# Patient Record
Sex: FEMALE | Race: WHITE
[De-identification: names, ages, dates, MRNs, and addresses within clinical notes are randomized per-mention and may not be internally consistent; named-entity substitution may affect disease eponyms.]

---

## 2020-12-29 NOTE — RAD
XR Chest 1 View Portable



History: Preop evaluation



Comparison: Radiograph 2006



Findings: Heart size is enlarged. Mild pulmonary venous congestion. Scattered scarring throughout the
 lungs. No pneumothorax. No acute osseous abnormality.



Impression: Cardiomegaly with apical scarring. No evidence for acute pneumonia.



Reported By: Alex Bravo 

Electronically Signed:  12/29/2020 2:05 PM

## 2020-12-29 NOTE — OP
DATE OF PROCEDURE:  12/29/2020



PREOPERATIVE DIAGNOSIS:  Acute appendicitis and perforated viscus.



POSTOPERATIVE DIAGNOSIS:  Acute appendicitis with rupture and pelvic abscess.



PROCEDURE PERFORMED:  Laparoscopic appendectomy and drainage of pelvic abscess.



ANESTHESIA:  General endotracheal.



ESTIMATED BLOOD LOSS:  5 mL.



FLUIDS GIVEN:  1100 mL crystalloids.



COUNTS:  Sponge and instrument counts were verified as correct x2.



COMPLICATIONS:  None apparent at the time of operation.



INDICATIONS FOR OPERATION:  This is an 86-year-old woman who presented with two

weeks history of "kidney infection," for which she was on antibiotics. 



Over the last three days, she has experienced worsening lower abdominal pain. 



Clinical and radiographic examination were consistent with acute appendicitis 
and

perforated viscus, marked by pneumoperitoneum and free fluid. 



The patient was brought to the operating room for presumptive diagnosis of acute

appendicitis with perforation. 



Findings are consistent with suppurative acute appendicitis with rupture at the 
base

with extraluminal appendicolith. 



Also noted is extensive amount of peritoneal inflammation localized to mostly 
the

right lower quadrant, and there is associated periappendiceal and pelvic 
abscesses. 



DESCRIPTION OF OPERATION:  Informed consent obtained from the patient, who was

brought to the operating room and placed in supine position. 



Following general anesthesia, a Rader catheter was inserted and placed to 
bedside

drain. 



The abdomen was then sterilely prepped and draped in usual fashion. 



The patient had previously undergone multiple abdominal operations, last of 
which

the umbilicus was excised, and given this history, we decided to enter the

peritoneal cavity through the left upper quadrant. 



She also had had a previous cholecystectomy. 



To achieve this, we anesthetized the left subcostal region with 0.25% Marcaine. 



A 5 mm transverse incision was made here using 11 scalpel. 



Peritoneal cavity was then entered using a Veress needle. 



The abdomen was then insufflated with 2.5 L of CO2 gas noting intraabdominal

pressure of 1 mmHg. 



Following abdominal insufflation, the Veress needle was removed and a 5 mm 
trocar

introduced using a Visiport under laparoscopy. 



Laparoscopy confirmed proper placement of the port.  No injuries to underlying

structures. 



Additional laparoscopy reveals the right lower quadrant completely encased by

omental adhesions.  An extensive amount of inflammation marked by purulent 
fibrinous

exudates and purulent fluid is noted in the deep pelvis.  Under direct 
laparoscopy,

two 5 mm suprapubic and left lower quadrant ports were placed after the 
overlying

skin were infiltrated with 0.25% Marcaine with epinephrine.  Appropriate 
incision

was made. 



The patient was then placed in a Trendelenburg position, rotated to the left. 



I used an Endo suction catheter to evacuate excess purulent fluid. 



The omental adhesions were then bluntly dissected off the right lower quadrant 
to

expose markedly dilated retrocecal appendix. 



I introduced the Endo [place] forceps through the suprapubic port site grasping 
the

appendix, which was elevated. 



At that juncture, we noted an extraluminal piece of an appendicolith at the base
of

the appendix and also 5 mm hole at the base of the appendix. 



The appendicolith was delivered off the abdominal cavity. 



Mesoappendix was then sterilely divided down to the base using the LigaSure 
device

with good hemostasis. 



The appendix itself was then divided at the appendicocecal junction between

Endoloop. 



The suppurative appendix was delivered off the abdominal cavity using the 
EndoCatch. 



Operative site was irrigated with saline. 



Excess irrigant fluid was evacuated using suction. 



#19 Estuardo drain was introduced into the pelvis with the tip  in the right

lateral gutter adjacent to the operative site. 



This was allowed to exit the abdominal cavity through the suprapubic port site. 



The drain was secured to anterior abdominal wall using 2-0 silk suture. 



Finding no other pathology, laparoscopy was terminated. 



The abdomen was desufflated. 



All ports and instruments removed and accounted for. 



Skin incisions closed using 4-0 Monocryl suture in subcuticular fashion. 



Dermabond was applied over incisional closure. 



The patient tolerated the operation without any apparent complication and was

returned to the recovery room in a satisfactory condition. 







Job ID:  [id number]



[facility]

## 2020-12-29 NOTE — HP
HISTORY OF PRESENT ILLNESS:  Ms. Muñoz is an 86-year-old pleasant  woman

who presented to a hospital in [place] this morning complaining of insidious onset

right lower quadrant abdominal pain, which started yesterday. 



The pain is described as sharp, occasionally crampy and associated with some nausea

and anorexia. 



She reports recent treatment of "kidney infection," which required antibiotic

treatment for 10 days, which was extended for another 10 days for a total of 20 days

of oral antibiotics. 



During that period, she was also placed on probiotics. 



The patient reports chronic fatigue over the same duration, although slightly

improved overall. 



She denies any change in her bowel habits.  Last bowel movement was yesterday and

was apparently normal. 



She denies any fevers or chills. 



She denies any hematochezia, melena, or unexplained weight loss. 



She denies having had any colonoscopies in the past.



PAST MEDICAL HISTORY:  Pertinent for peripheral vascular disease, coronary artery

disease, and essential hypertension. 



PAST SURGICAL HISTORY:  Pertinent for  x2 through a low midline incision.

Other surgeries include cervical and upper thoracic spinal fusion as well as triple

vessel coronary arterial bypass graft in .  She has also had previous

cholecystectomy. 



SOCIAL HISTORY:  She is a retired . 



She used to smoke up to a pack of cigarettes per day, did so for almost 30 years,

but has not smoked over the last 20 years. 



She denies any ethanol or illicit drug abuse.



FAMILY HISTORY:  Noncontributory for this patient's age.



CURRENT MEDICATIONS:  

1. Amlodipine 10 mg p.o. daily.

2. Aspirin 81 mg p.o. daily.

3. Benazepril 10 mg p.o. daily.

4. Florastor 250 mg p.o. b.i.d.

5. Hydrochlorothiazide 25 mg p.o. daily.

6. Metoprolol extended release 50 mg p.o. daily.

7. Vitamin D3 1000 units p.o. daily.

8. Tramadol 50 mg one to two p.o. b.i.d. p.r.n. for pain.



ALLERGIES:  MEPERIDINE AND OPIOIDS, WHICH IS MORE LIKE GI UPSET AS OPPOSED TO TRUE

ALLERGIES. 



REVIEW OF SYSTEMS:  10-point review of systems essentially unremarkable except as

stated in past medical history and chief complaint. 



PHYSICAL EXAMINATION:

GENERAL:  This reveals an 86-year-old normally developed woman, who is otherwise

coherent, interactive, and appears stated age.  The patient is alert and oriented

x3.  She appears to be in no acute distress at the time of my evaluation. 

VITAL SIGNS:  Blood pressure 123/43, pulse 77, respiratory rate is 18, temperature

is 98.2 degrees Fahrenheit, oxygen saturation is 98% on room air. 

HEENT:  Normocephalic and atraumatic.  Pupils equally round and reactive to light

and accommodation.  Extraocular muscles are intact bilaterally.  No scleral icterus

present.  Oral mucosa is pink and moist.  No lesions are noted. 

NECK:  She has no jugular venous distention noted. 

HEART:  Regular rate and rhythm.  No murmurs or gallops auscultated. 

LUNGS:  Clear to auscultation bilaterally.  Her breathing is regular and nonlabored. 

ABDOMEN:  Soft with right lower quadrant tenderness to palpation.  She has a

positive Rovsing sign.  She has a healed irregularly shaped infraumbilical midline

incision consistent with previous history of  x2.  She also has a healed

median sternotomy incision consistent with previous history of three-vessel coronary

arterial bypass grafts.  Liver and spleen otherwise nonpalpable below costal margin. 

NEUROLOGIC:  No focal deficits present.



LABORATORY FINDINGS:  Today include a CBC with 10,500 white blood cells, hemoglobin

and hematocrit 12.5 and 38.6 respectively, platelet count is 235,000. 



Metabolic profile:  Sodium 139, potassium 4.4, chloride is 107, bicarb is 23, BUN

23, creatinine is 1.58, glucose 112, AST and ALT normal at 12 and 8 units/L

respectively. 



IMAGING DATA:  I have personally reviewed the CT scan of the abdomen and pelvis,

which is remarkable for dilated appendix with periappendiceal fat stranding as well

as inflammation around the cecum.  There is scattered tiny pneumoperitoneum as well

as free fluid within the pelvis. 



IMPRESSION:  Acute appendicitis with perforation.  Given this 86-year-old woman has

not had any previous lower endoscopies, neoplastic process in this age group cannot

be excluded as the etiology of the perforated viscus with possible secondary acute

appendicitis. 



RECOMMENDATIONS AND PLAN:  Laparoscopic appendectomy and possible exploratory

laparotomy if indicated. 



Above findings and plan has been discussed with the patient and her adult daughter

at bedside.  All in the presence of the patient's nurse. 



I have advised the patient of the risks and benefits of the proposed surgery to

include, but not limited to bleeding, infection, injury to bowel or surrounding

structures. 



The patient is also fully aware that if neoplastic process was suspected by virtue

of all operative findings, exploratory laparotomy and possible bowel resection will

be contemplated at that time. 



I have answered her questions.  The patient has granted consent for this admission

and surgical intervention. 





Job ID:  [id number]

## 2020-12-29 NOTE — CT
CT ABDOMEN AND PELVIS WITHOUT CONTRAST:

12/29/20

 

A noncontrast CT was ordered without oral or IV contrast. Comparison is made with a 10/16/13 study. 

 

The most pertinent findings are in the CT of the pelvis. There is inflammatory reaction around the ba
se of the cecum in the right lower quadrant. What I interpret to be the appendix is enlarged and meena
ures about 1.5 cm in diameter and has slightly enhancing walls. Additionally, there is free fluid in 
the cul-de-sac, more on the right than the left, and a few tiny locules of free air are seen just rick
eath the anterior abdominal wall in the midline and probably a couple in the right lower quadrant as 
well. The findings suggest the possibility of acute appendicitis with perforation. The bowel in the r
egion also could have pathology in it and the appendiceal enlargement is secondary. Pure acute append
icitis is unusual in this age group, so the possibility of other pathology should be considered. Whil
e there is abundant gas in the right and transverse colon, this is felt to be reactive in nature. The
re are no findings strongly suggestive of overt obstruction. The urinary bladder has somewhat thick w
alls uniformly, but it is also not very full. 

 

The lung bases are clear except for some minimal dependent atelectasis. The liver and spleen were unr
emarkable for a non-contrast study. There is a question of an 8 to 9 mm lucency at the junction of th
e neck and body of the pancreas of uncertain significance. I do not see it on the 2013 scan. The kidn
eys show no mass, hydronephrosis, or signs of renal calculi. A 1.7 cm lucency in the right adrenal gl
and is no different than 2013 and is most likely a small adenoma. There is a new rounded area in the 
left adrenal gland measuring about 1.7 cm in size with CT numbers in the 9 to 10 range. While not pre
sent before, an adenoma seems likely. 

 

IMPRESSION: 

1.      Inflammatory changes in the right lower quadrant around the cecal base with a few tiny locule
s of free air anteriorly and free fluid in the pelvis. This, along with some dilation of the appendix
 (1.5 cm) and thickening of its walls, raises the question of acute appendicitis. There is a chance t
hat there could have been a pathologic event nearby and it is secondarily inflamed. As appendicitis i
s somewhat unusual in the age group, associated pathology is certainly possible. 

 

2.      Probable left adrenal adenoma of no current concern. Right adrenal adenoma unchanged since 20
13. 

 

3.      Small 8 to 9 mm lucency at the junction of the neck and body of the pancreas of uncertain sig
nificance, not present on a 2013 scan.

 

 

Initial findings discussed with Dr. Valentino at 1039 on 12/29/20.

 

POS: HOME

## 2020-12-30 NOTE — PRG
DATE OF SERVICE:  12/30/2020



SUBJECTIVE:  Ms. Muñoz is an 86-year-old woman, postoperative day #1, status post

laparoscopic appendectomy and drainage of pelvic abscess. 



The patient is awake and alert this morning, reporting incisional pain. 



She is tolerating clear liquid diet. 



Urinary output is adequate for this patient's age and weight.



OBJECTIVE:  VITAL SIGNS:  This morning include blood pressure 120/59, pulse is 72,

respiratory rate is 16, maximum temperature since yesterday is 98.5 degrees

Fahrenheit, and oxygen saturation this morning 93% on room air. 

HEENT:  Pupils are equally round, reactive to light and accommodation. 

HEART:  Regular rate and rhythm. 

LUNGS:  Clear to auscultation bilaterally.  Her breathing is regular and nonlabored. 

ABDOMEN:  Soft, moderately distended and tender to palpation.  She has no peritoneal

signs on examination.  Derek-Kaye drain has returned 80 mL of cloudy fluid. 

NEUROLOGIC:  No focal deficits present.



LABORATORY FINDINGS:  Today include a CBC with 11,100 white blood cells, hemoglobin

and hematocrit are stable at 11.1 and 34.2 respectively, and platelet count is

195,000. 



Metabolic profile:  Sodium 140, potassium 4.4, chloride is 108, bicarb is 21, BUN is

30, creatinine is 1.64, and glucose is 132.  Magnesium is 1.6 and phosphorus is 4.0. 



IMPRESSION:  

1. Postoperative day #1, status post laparoscopic appendectomy and drainage of

pelvic abscess. 

2. Acute hypomagnesemia.

3. Acute kidney injury.



PLAN:  

1. Correct abnormal electrolytes.

2. Continue with fluid resuscitation and monitoring urinary output and serum

creatinine as endpoint of our resuscitation. 

3. Continue with intravenous antibiotic therapy for the acute peritonitis secondary

to ruptured appendix with pelvic abscess. 

4. Increase activity per Physical and Occupational Therapy.

5. Advance diet as tolerated. 

Above findings and plan was discussed with the patient and her adult daughter at

bedside.  They both indicated understanding information provided.  I have answered

their questions. 







Job ID:  [id number]

## 2020-12-31 NOTE — PRG
DATE OF SERVICE:  12/30/2020



SUBJECTIVE:  The patient was seen this evening during rounds.  She was lying in bed

and reported she has about 7/10 abdominal pain.  States that she has not asked for

pain medications, but when she has received them before, they have helped.  Nursing

called to the bedside to give the patient pain medication. 



OBJECTIVE:  VITAL SIGNS:  Temperature 97.6, pulse 86, respirations 20, oxygen

saturation 94% on room air, and blood pressure 146/66. 

GENERAL:  Well-appearing elderly female, lying in bed with no signs of acute

distress. 

ABDOMEN:  Soft, mildly distended, and appropriately tender to palpation.  LALA drain

with purulent discharge and bulb. 



ASSESSMENT:  

1. Postoperative day #1, status post laparoscopic appendectomy with drainage of

pelvic abscess due to ruptured appendix with pelvic abscess. 

2. History of peripheral vascular disease, coronary artery disease, hypertension,

chronic kidney disease, and coronary artery bypass graft. 



PLAN:  Continue current diet and pain regimen.  Continue IV fluids.  Continue to

monitor pain closely as the patient does not ask for pain medications.  I did

discuss this with the nurse.  She did work with Physical Therapy today and walked

quite a bit.  Monitor for return of bowel function. 







Job ID:  [id number]

## 2020-12-31 NOTE — PRG
DATE OF SERVICE:  12/31/2020



SUBJECTIVE:  Ms. Muñoz is an 86-year-old woman who is postoperative day #2, status

post laparoscopic appendectomy and drainage of pelvic abscess.  The patient is awake

and alert.  She reports right lower quadrant abdominal pain, which she rates at 7/10

to 8/10. 



She has not taken enough of oral pain medications nevertheless.   



She is tolerating clear liquid diet.  She denies passing any flatus or bowel

movement. 



Urinary output remains adequate for the patient's age and weight.



OBJECTIVE:  VITAL SIGNS:  Today include, blood pressure 125/71, pulse is 84,

respiratory rate is 18, maximum temperature in last 24 hours is 98.4 degrees

Fahrenheit, oxygen saturation is 92% on room air. 

HEENT:  Pupils equal, round, reactive to light and accommodation.  She has no

jugular venous distention noted. 

HEART:  Reveals regular rate and rhythm. 

LUNGS:  Reveals bibasilar rhonchi.  Breathing regular and nonlabored. 

ABDOMEN:  Soft, moderately distended with gas.  Incisions are intact, clean and dry.

 Derek-Kaye drain return 70 mL of cloudy ascitic fluid overnight. 

NEUROLOGIC:  Reveals no focal deficits present.



LABORATORY FINDINGS:  Today include, a CBC with 9,900 white blood cells, hemoglobin

and hematocrit are 9.8 and 32.8 respectively.  The platelet count is 191,000. 



Metabolic profile; sodium 141, potassium 4.2, chloride is 109, bicarb is 22, BUN 31,

creatinine is 1.57, glucose is 88, magnesium 1.8, and phosphorus 2.9. 



IMPRESSIONS:  

1. Postop day #2 status post laparoscopic appendectomy and drainage of pelvic

abscess. 

2. Resolving acute kidney injury.

3. Acute hypomagnesemia.

4. Acute hypophosphatemia.

5. Acute blood loss anemia.



PLAN:  

1. Correct abnormal electrolytes.

2. Optimize pain control.

3. Increase activity per Physical and Occupational Therapy.  We will ask PM and R to

evaluate the patient for possible inpatient rehabilitation post discharge. 

Above findings and plan discussed with the patient and her adult daughter at

bedside. 



Both indicated understanding of information provided.  I have answered their

questions. 







Job ID:  [id number]

## 2021-01-01 NOTE — PRG
DATE OF SERVICE:  12/31/2020



SUBJECTIVE:  The patient was seen this evening during rounds.  She was lying in bed,

resting comfortably, and asleep with no signs of acute distress.  Nursing reported

no acute events. 



OBJECTIVE:  VITAL SIGNS:  Temperature 97.6, pulse 88, respirations 18, oxygen

saturation 92% on 2 L nasal cannula, blood pressure 139/71. 



ASSESSMENT:  

1. Postop day #2 status post laparoscopic appendectomy with drainage of pelvic

abscess secondary to ruptured appendix with pelvic abscess. 

2. History of peripheral vascular disease, coronary artery disease, hypertension,

chronic kidney disease, and CABG. 



PLAN:  Continue current clear liquid diet.  IV fluids have been stopped.  Monitor

urinary output.  Repeat blood work in the morning.  Ambulation and sitting up in bed

are paramount as we are pending return of bowel function.  Also, we will become more

aggressive with the incentive spirometry as the patient is now requiring oxygen and

likely has related atelectasis. 







Job ID:  [id number]

## 2021-01-01 NOTE — PRG
DATE OF SERVICE:  01/01/2021



SUBJECTIVE:  The patient was seen during morning rounds on the surgical floor.  The

patient is currently awake, alert, sitting up in bed, in no distress.  The patient

had no overnight events.  The patient states her pain is much improved.  The patient

does report passing flatus 4 times this morning.  No bowel movement.  The patient is

tolerating a clear liquid diet. 



Urinary output is adequate for age and weight.



OBJECTIVE:  VITAL SIGNS:  Temperature 97.4, pulse 92, respirations 16, SpO2 95% on

room air, blood pressure 166/69. 

GENERAL:  Well-appearing elderly female, in no distress. 

HEENT:  Unremarkable, no JVD. 

RESPIRATORY:  Respirations are even and nonlabored, bilateral breath sounds clear in

upper lobes, diminished breath sounds in bilateral lower lobes. 

CARDIAC:  Regular rate regular rhythm.  No pedal edema. 

ABDOMEN:  Soft, moderately distended with gas.  No peritoneal signs.  Incisions are

intact, clean and dry.  LALA drain returned 170 mL overnight cloudy yellow output. 

EXTREMITIES:  Moves all extremities, neurovascularly intact. 

NEUROLOGIC:  No focal deficits.



LABORATORY DATA:  WBC 9.7, RBC 3.45, hemoglobin 11.0, hematocrit 35.1, platelets

216, sodium 141, potassium 4.0, chloride 108, carbon dioxide 23, BUN 28, creatinine

1.51, estimated GFR 33, glucose 74, calcium 8.9, phosphorus 2.5, magnesium 1.7. 



DIAGNOSTICS:  No new diagnostics to review today.



IMPRESSION:  

1. Postop day #3, status post laparoscopic appendectomy and drainage of pelvic

abscess. 

2. Resolving acute kidney injury.

3. Acute blood loss anemia, stable.

4. History of peripheral vascular disease.

5. Coronary artery disease.

6. Hypertension.

7. Chronic kidney disease.

8. CABG.



PLAN:  We will increase the patient's diet to heart healthy as tolerated.  Continue

physical and occupational therapy.  Aggressive pulmonary toilet.  We will

discontinue IV antibiotics and switch to oral, as the patient will be on a regular

diet.  The patient has been ambulating well with physical therapy who recommended

home health.  Case Management has been contacted to arrange for this.  We will

continue to monitor LALA drain output.  The plan was discussed with Dr. Morley. 







Job ID:  [id number]

## 2021-01-02 NOTE — PRG
DATE OF SERVICE:  01/02/2021



SUBJECTIVE:  The patient was seen during morning rounds.  Awake, alert, sitting up

in bed, in no distress.  The patient had some nausea overnight, but it has improved.

 The patient is having bowel movements.  The patient's pain is controlled. 



Urinary output has been adequate for age and weight. 



Abdominal drain output in the last 24 hours has been 125 mL of purulent yellow

drainage. 



OBJECTIVE:  VITAL SIGNS:  Temperature 98.3, pulse 87, respirations 18, SpO2 of 94%

on room air, blood pressure 156/82. 

GENERAL:  Well-appearing, elderly female, awake, alert, in no distress. 

HEENT:  Unremarkable, no JVD. 

RESPIRATORY:  Respirations are even and nonlabored, no respiratory distress. 

CARDIAC:  Regular rate and regular rhythm.  No edema. 

ABDOMEN:  Soft, less distended, no peritoneal signs.  Incisions are clean, dry, and

intact. 

EXTREMITIES:  Moves all extremities, neurovascularly intact.



LABORATORY DATA:  WBC 11.4, RBC 3.83, hemoglobin 11.9, hematocrit 38.6, platelets

274.  Sodium 139, potassium 3.7, chloride 107, BUN 27, creatinine 1.26, estimated

GFR 40, phosphorus 2.4, magnesium 1.8. 



DIAGNOSTICS:  No new diagnostics to review today.



IMPRESSION:  

1. Postop day #4, status post laparoscopic appendectomy and drainage of pelvic

abscess. 

2. Resolving acute kidney injury.

3. Acute blood loss anemia, stable.

4. History of peripheral vascular disease, coronary artery disease, hypertension,

chronic kidney disease, coronary artery bypass graft. 



PLAN:  Continue heart healthy diet as tolerated.  We will restart IV antibiotics as

the patient still has purulent drainage from her LALA drain.  Continue PT and OT.  The

plan was discussed with Dr. Morley, who agrees. 







Job ID:  [id number]

## 2021-01-02 NOTE — PRG
DATE OF SERVICE:  01/01/2021



SUBJECTIVE:  Patient was seen this evening during rounds.  She was sitting up on a

bedside commode.  The patient did have a bowel movement.  This is her first bowel

movement.  Nursing reported no acute events. 



OBJECTIVE:  VITAL SIGNS:  Temperature 97.6, pulse 81, respirations 18, oxygen

saturation 92% on room air, blood pressure 151/72. 

GENERAL:  Well-appearing elderly female, sitting up on commode with no signs of

acute distress. 



ASSESSMENT:  

1. Postoperative day 4, status post laparoscopic appendectomy and drainage of pelvic

abscess due to ruptured appendix. 

2. History of peripheral vascular disease, coronary artery disease, hypertension,

chronic kidney disease, and coronary artery bypass grafting. 



PLAN:  Continue current diet and pain regimen.  Continue physical and occupational

therapy.  Continue aggressive pulmonary hygiene as patient likely has atelectasis.

Her O2 saturation is a little bit lower than we would like.  We would like to

discharge her home tomorrow with oral antibiotics.  She does live with her daughter.

 We will discuss with Dr. Morley in the morning the plan for her LALA drain. 







Job ID:  [id number]

## 2021-01-02 NOTE — EKG
Test Reason : 

Blood Pressure : ***/*** mmHG

Vent. Rate : 075 BPM     Atrial Rate : 075 BPM

   P-R Int : 152 ms          QRS Dur : 098 ms

    QT Int : 386 ms       P-R-T Axes : -23 -20 -58 degrees

   QTc Int : 431 ms

 

Normal sinus rhythm

T wave abnormality, consider inferior ischemia

T wave abnormality, consider anterolateral ischemia

Abnormal ECG

 

Confirmed by ANNA PRADHAN, MAXWELL (12),  YORDY FOSTER ([id number]) on 1/2/2021 3:22:00 PM

 

Referred By:             Confirmed By:MAXWELL CASTILLO MD

## 2021-01-03 NOTE — PRG
DATE OF SERVICE:  01/02/2021



SUBJECTIVE:  The patient was seen this evening during rounds.  She was lying in bed,

resting comfortably, and asleep with no signs of acute distress.  Nursing reported

no acute events. 



OBJECTIVE:  VITAL SIGNS:  Temperature 97.9, pulse 78, respirations 18, oxygen

saturation 95% on room air, and blood pressure 151/62. 



ASSESSMENT:  

1. Postoperative day 4 status post laparoscopic appendectomy with drainage of a

pelvic abscess secondary to a ruptured appendix. 

2. History of deep venous thrombosis, coronary artery disease, hypertension, chronic

kidney disease, and coronary artery bypass graft. 



PLAN:  Continue current diet and pain regimen.  Continue physical and occupational

therapy.  Continue IV antibiotics.  The patient's lisinopril increased yesterday.

Continue to monitor blood pressure. 







Job ID:  [id number]

## 2021-01-03 NOTE — PRG
DATE OF SERVICE:  01/03/2021



SUBJECTIVE:  The patient was seen during morning rounds.  Awake, alert, sitting up

in bed, in no distress.  The patient had no overnight events.  The patient denies

any nausea.  The patient's urinary output has been adequate for the patient's age

and weight. 



OBJECTIVE:  VITAL SIGNS:  Temperature 98.2, pulse 88, respirations 12, SpO2 of 96%

on room air, blood pressure 147/67. 

GENERAL:  Well-appearing elderly female, awake, alert, in no distress. 

HEENT:  Unremarkable, no JVD. 

RESPIRATORY:  Respirations are even and nonlabored, bilateral breath sounds clear. 

CARDIAC:  Regular rate, regular rhythm.  No pedal edema. 

ABDOMEN:  Soft, nondistended, no peritoneal signs, nontender, incisions are clean,

dry, and intact.  LALA drain, some purulent contents with yellowish fluid, output in

the last 24 hours 75 mL. 

EXTREMITIES:  Moves all extremities, neurovascularly intact x4. 

NEUROLOGIC:  No focal deficits.  GCS 15.



LABORATORY DATA:  WBC 8.8, RBC 3.72, hemoglobin 11.2, hematocrit 37.2, and platelets

286.  Sodium 141, potassium 3.4, chloride 108, BUN 23, creatinine 1.25, estimated

GFR 41, glucose 112, calcium 8.9, phosphorus 2.5, and magnesium 1.6. 



DIAGNOSTICS:  No new diagnostics to review today.



IMPRESSION:  

1. Postoperative day #5, status post laparoscopic appendectomy and drainage of the

pelvic abscess. 

2. Resolving acute kidney injury.

3. Acute blood loss anemia, stable.

4. History of peripheral vascular disease, coronary artery disease, hypertension,

chronic kidney disease, and coronary artery bypass graft. 



PLAN:  Continue supportive care and diet as tolerated.  Continue IV antibiotics.

Continue to monitor LALA drain output.  Continue PT and OT.  Replace electrolytes.

Repeat labs in the morning.  The plan was discussed with the patient and attending,

who agree. 







Job ID:  [id number]

## 2021-01-04 NOTE — PRG
DATE OF SERVICE:  01/03/2021



SUBJECTIVE:  Patient was seen this evening during rounds.  She was lying in bed,

resting comfortably and asleep with no signs of acute distress.  Nursing reported no

acute events. 



OBJECTIVE:  VITAL SIGNS:  Temperature 97.9, pulse 80, respirations 16, oxygen

saturation 92% on room air, blood pressure 151/69. 



ASSESSMENT:  

1. Postop day 5 status post laparoscopic appendectomy with drainage of pelvic

abscess secondary to ruptured appendix. 

2. History of peripheral vascular disease, coronary artery disease, hypertension,

chronic kidney disease, and coronary artery bypass graft. 



PLAN:  Continue current diet and pain regimen.  Continue physical and occupational

therapy.  Continue IV antibiotics.  The patient to be reevaluated by Dr. Morley

tomorrow.  Possible discharge in the next 24-48 hours home with home health and oral

antibiotics. 







Job ID:  [id number]

## 2021-01-04 NOTE — DIS
DATE OF ADMISSION:  12/29/2020



DATE OF DISCHARGE:  01/04/2021



ADMITTING DIAGNOSES:  Acute appendicitis with rupture and pelvic abscess.



DISCHARGE DIAGNOSES:  Acute appendicitis with rupture and pelvic abscess.



OPERATIONS AND PROCEDURES:  Laparoscopic appendectomy and drainage of pelvic abscess

on 12/29/2020 by Dr. Morley, please see separate dictation for operative report. 



HISTORY AND HOSPITAL COURSE:  An 87-year-old woman was admitted on 12/29/2020 with

worsening right-sided lower quadrant abdominal pain. 



Clinical and radiographic examination were consistent with acute appendicitis with

pneumoperitoneum and free fluid, suggestion of perforation. 



The patient was taken to operating room for laparoscopic appendectomy and drainage

of periappendiceal and pelvic abscesses. 



Following the surgery, she was admitted to general surgical floor, where she

remained at time of discharge. 



Her care here included IV antibiotics, which were converted to oral once the patient

is tolerating diet. 



This morning, she is evaluated, she is ambulating modestly with assistance,

participating with physical and occupational therapy. 



She is tolerating general diet and having bowel movement. 



Urinary output is adequate for the patient's age and weight. 



The Derek-Kaye drain has returned less than 100 mL of serous fluid over the last

24 hours, and therefore, was removed without incident. 



LABORATORY STUDIES:  Today include a CBC with normal white blood cell count of 9900,

hemoglobin and hematocrit stable at 11.0 and 35.3 respectively.  Platelet count

330,000. 



Metabolic profile; sodium 142, potassium 3.6, chloride is 106, bicarb is 26, BUN 20,

creatinine is 1.18, compared to high 1.64 on 12/30/2020, and it was 1.58 on

admission on 12/29/2020.  The patient has a history of stage 3 kidney disease.,

being followed up by Nephrology. 



DISCHARGE INSTRUCTIONS:  She has maximized hospital benefit and will be discharged

home today with the following instructions. 

1. She follows up with her primary care physician within 1 week of discharge as we

had made some adjustments on her antihypertensive in order to better control her

blood pressure. 

2. She is to follow up with me in the Surgery [facility] in 2 weeks.

3. She is to take Tylenol 650 to 1000 mg p.o. q.6 hours p.r.n. pain, alternating

this with tramadol.  She has a recent prescription of 100 pills of tramadol.

Therefore, the patient is instructed to take tramadol 50 mg p.o. q.6 hours p.r.n.

breakthrough pain. 

4. She is instructed to resume all her pre-hospital medications including aspirin 81

mg p.o. daily, vitamin D3 of 4000 units p.o. daily, hydrochlorothiazide 25 mg p.o.

daily, amlodipine 10 mg p.o. daily, clonidine 0.1 mg p.o. at bedtime p.r.n.,

metoprolol-XL 50 mg p.o. at bedtime. 

5. The patient will be discharged home with home health care and physical therapy.

6. She is instructed to call me with any questions or problems including intolerance

to oral intake, exacerbation of abdominal pain, any abnormal drainage from the

incisional wounds, fever in excess of 101 degrees Fahrenheit. 



The patient indicates understanding of information provided to her in the presence

of her nurse. 



I have answered her questions.  The patient has expressed deep gratitude for the

care rendered to her during this hospitalization and surgery. 





Job ID:  [id number]

## 2021-01-04 NOTE — PQF
CLINICAL DOCUMENTATION CLARIFICATION FORM:







                           Dear Dr. Morley                         Date: 1/4/21



Please exercise your independent, professional judgment in responding to the 
clarification form. 

Clinical indicators are provided on the bottom of this form for your review.



Please check appropriate box(es):



[ x ] Protein Calorie Malnutrition:    [  ] Mild      [ x] Moderate   [  ] 
Severe   



[  ] Other Malnutrition (please specify) 
_________________________________________



[  ] Underweight without malnutrition



[  ] Cachexia 



[  ] Other diagnosis ___________



[  ] Unable to determine



In addition, please specify:

Present on Admission (POA):  [ x ] Yes             [  ] No             [  ] 
Unable to determine



For continuity of documentation, please document condition throughout progress 
notes and discharge summary.  Thank You.



To be completed by CDI/Coding staff for physician review: 



CLINICAL INDICATORS - SIGNS / SYMPTOMS / LABS    / RESULTS AND LOCATION IN MR



RD ASSESSMENT 12/30: "GENERALIZED MILD MUSCLE AND FAT WASTING SUGGESTIVE OF 
MODERATE MALNUTRITION IN THE CONTEXT OF CHRONIC ILLNESS."

"-6.4 % WEIGHT CHANGE"



BMI 18.9





RISK FACTORS    / RESULTS AND LOCATION IN MR



ADVANCED AGE

RUPTURED APPENDIX AND PELVIC ABSCESS



TREATMENT    / RESULTS AND LOCATION IN MR



DIETARY SUPPLEMENTS RECOMMENDED WHEN DIET ADVANCED (RD ASSESSMENT 12/30 / 
ordered 1/1)

ZOFRAN PRN NAUSEA (LAST GIVEN 1/2- SEE MAR)





Moderate Malnutrition (in acute illness)

   Energy Intake: <75% of estimated energy requirement for > 7 days

   Weight Loss:  1-2%/1 week; 5%/ 1 month; 7.5%/3 months

   Other: mild body fat loss; mild muscle mass loss; mild fluid accumulation; 

Severe Malnutrition (in acute illness)

   Energy Intake: = 50% of estimated energy requirement for = 5 days

   Weight Loss: >2%/1 week; >5%/1 month; >7.5%/3 months

   Other: moderate body fat loss; moderate muscle mass loss; moderate- severe 
fluid accumulation; measurably reduced  strength

Moderate Malnutrition (in chronic illness)

   Energy Intake: <75% of estimated energy requirement for =1 month

   Weight Loss: 5%/1 month; 7.5%/3 months; 10%/6 months; 20%/1 year

   Other: mild body fat loss; mild muscle mass loss; mild fluid accumulation

Severe Malnutrition (in chronic illness)

   Energy Intake: =75% of estimated energy requirement for =1 month

   Weight Loss: >5%/1 month; >7.5%/3 months; >10%/6 months; >20%/1 year

   Other: severe body fat loss; severe muscle mass loss; severe fluid 
accumulation; measurably reduced  strength























CDS Signature:   Anne Wallace RN                                 Phone #: 
[phone]                         Date: 1/4/21



                 This is a permanent part of the Medical Record

[facility]

## 2021-01-26 NOTE — CT
CT abdomen and pelvis noncontrast



HISTORY: Prior peritoneal abscess. Follow-up.



COMPARISON: 12/29/2020.



FINDINGS: Small amount of right pleural fluid and mild dependent bibasilar atelectasis are now presen
t. IV contrast was unable to be given due to inadequate IV access. Oral contrast was administered.



Gallbladder is surgically absent. The tiny low-density lesions of the pancreas and right adrenal glan
d are not well visualized without IV contrast. Stable CT appearance.



Subtle hyperdense material within each renal collecting system is likely related to absorption of the
 oral contrast and excretion.



Prominent calcification of the arterial structures.



Postoperative changes right lower quadrant, including a hemostasis clip lateral to the right colon. N
o significant residual stranding or residual fluid collections in the right lower quadrant.



Diverticulosis without evidence of diverticulitis. Along the superficial margin of the left lower abner
drant anterior abdominal wall, a somewhat ill-defined 1.0 cm heterogeneous area of nodularity may

be related to recent trauma or injection. Without internal fluid collection, it is of doubtful clinic
al significance.



  



IMPRESSION :

No residual inflammation or fluid in the right lower quadrant. No new abscess collection.



Reported By: KEISHA Cristobal 

Electronically Signed:  1/26/2021 9:03 AM

## 2022-04-05 ENCOUNTER — HOSPITAL ENCOUNTER (OUTPATIENT)
Dept: HOSPITAL 57 - BURRAD | Age: 87
Discharge: HOME | End: 2022-04-05
Attending: FAMILY MEDICINE
Payer: MEDICARE

## 2022-04-05 DIAGNOSIS — K59.03: Primary | ICD-10-CM

## 2022-04-05 PROCEDURE — 74018 RADEX ABDOMEN 1 VIEW: CPT

## 2022-06-23 ENCOUNTER — HOSPITAL ENCOUNTER (EMERGENCY)
Dept: HOSPITAL 57 - BURERS | Age: 87
Discharge: HOME | End: 2022-06-23
Payer: MEDICARE

## 2022-06-23 DIAGNOSIS — I71.4: Primary | ICD-10-CM

## 2022-06-23 DIAGNOSIS — Z79.899: ICD-10-CM

## 2022-06-23 DIAGNOSIS — R19.5: ICD-10-CM

## 2022-06-23 DIAGNOSIS — Z79.82: ICD-10-CM

## 2022-06-23 DIAGNOSIS — E78.5: ICD-10-CM

## 2022-06-23 DIAGNOSIS — I25.10: ICD-10-CM

## 2022-06-23 DIAGNOSIS — Z87.891: ICD-10-CM

## 2022-06-23 DIAGNOSIS — I10: ICD-10-CM

## 2022-06-23 LAB
ALBUMIN SERPL BCG-MCNC: 3.9 G/DL (ref 3.4–4.8)
ALP SERPL-CCNC: 55 U/L (ref 40–110)
ALT SERPL W P-5'-P-CCNC: 9 U/L (ref 8–55)
ANION GAP SERPL CALC-SCNC: 15 MMOL/L (ref 10–20)
AST SERPL-CCNC: 15 U/L (ref 5–34)
BASOPHILS # BLD AUTO: 0.1 THOU/UL (ref 0–0.2)
BASOPHILS NFR BLD AUTO: 1.9 % (ref 0–1)
BILIRUB SERPL-MCNC: 0.5 MG/DL (ref 0.2–1.2)
BUN SERPL-MCNC: 22 MG/DL (ref 9.8–20.1)
CALCIUM SERPL-MCNC: 9.1 MG/DL (ref 7.8–10.44)
CHLORIDE SERPL-SCNC: 105 MMOL/L (ref 98–107)
CO2 SERPL-SCNC: 23 MMOL/L (ref 23–31)
CREAT CL PREDICTED SERPL C-G-VRATE: 0 ML/MIN (ref 70–130)
EOSINOPHIL # BLD AUTO: 0.1 THOU/UL (ref 0–0.7)
EOSINOPHIL NFR BLD AUTO: 0.9 % (ref 0–10)
GLOBULIN SER CALC-MCNC: 2.6 G/DL (ref 2.4–3.5)
GLUCOSE SERPL-MCNC: 157 MG/DL (ref 83–110)
HGB BLD-MCNC: 11.8 G/DL (ref 12–16)
LIPASE SERPL-CCNC: 29 U/L (ref 8–78)
LYMPHOCYTES # BLD AUTO: 0.6 THOU/UL (ref 1.2–3.4)
LYMPHOCYTES NFR BLD AUTO: 8.1 % (ref 21–51)
MCH RBC QN AUTO: 31.6 PG (ref 27–31)
MCV RBC AUTO: 103 FL (ref 78–98)
MONOCYTES # BLD AUTO: 0.7 THOU/UL (ref 0.11–0.59)
MONOCYTES NFR BLD AUTO: 8.6 % (ref 0–10)
NEUTROPHILS # BLD AUTO: 6.1 THOU/UL (ref 1.4–6.5)
NEUTROPHILS NFR BLD AUTO: 80.5 % (ref 42–75)
PLATELET # BLD AUTO: 305 THOU/UL (ref 130–400)
POTASSIUM SERPL-SCNC: 4.1 MMOL/L (ref 3.5–5.1)
RBC # BLD AUTO: 3.73 MILL/UL (ref 4.2–5.4)
SODIUM SERPL-SCNC: 139 MMOL/L (ref 136–145)
SP GR UR STRIP: 1.01 (ref 1–1.03)
WBC # BLD AUTO: 7.6 THOU/UL (ref 4.8–10.8)

## 2022-06-23 PROCEDURE — 81003 URINALYSIS AUTO W/O SCOPE: CPT

## 2022-06-23 PROCEDURE — 80053 COMPREHEN METABOLIC PANEL: CPT

## 2022-06-23 PROCEDURE — 96360 HYDRATION IV INFUSION INIT: CPT

## 2022-06-23 PROCEDURE — 83690 ASSAY OF LIPASE: CPT

## 2022-06-23 PROCEDURE — 74177 CT ABD & PELVIS W/CONTRAST: CPT

## 2022-06-23 PROCEDURE — 94760 N-INVAS EAR/PLS OXIMETRY 1: CPT

## 2022-06-23 PROCEDURE — 85025 COMPLETE CBC W/AUTO DIFF WBC: CPT

## 2022-07-11 ENCOUNTER — HOSPITAL ENCOUNTER (EMERGENCY)
Dept: HOSPITAL 57 - BURERS | Age: 87
Discharge: ANOTHER HOSPITAL | End: 2022-07-11
Payer: MEDICARE

## 2022-07-11 ENCOUNTER — HOSPITAL ENCOUNTER (INPATIENT)
Dept: HOSPITAL 92 - 2NO | Age: 87
LOS: 3 days | Discharge: ANOTHER HOSPITAL | DRG: 306 | End: 2022-07-14
Attending: STUDENT IN AN ORGANIZED HEALTH CARE EDUCATION/TRAINING PROGRAM | Admitting: FAMILY MEDICINE
Payer: MEDICARE

## 2022-07-11 VITALS — BODY MASS INDEX: 15.5 KG/M2

## 2022-07-11 DIAGNOSIS — N18.30: ICD-10-CM

## 2022-07-11 DIAGNOSIS — I25.10: ICD-10-CM

## 2022-07-11 DIAGNOSIS — Z87.891: ICD-10-CM

## 2022-07-11 DIAGNOSIS — Z79.899: ICD-10-CM

## 2022-07-11 DIAGNOSIS — I50.9: ICD-10-CM

## 2022-07-11 DIAGNOSIS — J90: ICD-10-CM

## 2022-07-11 DIAGNOSIS — I13.0: ICD-10-CM

## 2022-07-11 DIAGNOSIS — Z79.82: ICD-10-CM

## 2022-07-11 DIAGNOSIS — N17.9: ICD-10-CM

## 2022-07-11 DIAGNOSIS — Z95.5: ICD-10-CM

## 2022-07-11 DIAGNOSIS — R77.8: ICD-10-CM

## 2022-07-11 DIAGNOSIS — J43.9: ICD-10-CM

## 2022-07-11 DIAGNOSIS — I44.7: ICD-10-CM

## 2022-07-11 DIAGNOSIS — I25.2: ICD-10-CM

## 2022-07-11 DIAGNOSIS — D63.1: ICD-10-CM

## 2022-07-11 DIAGNOSIS — I73.9: ICD-10-CM

## 2022-07-11 DIAGNOSIS — Z20.822: ICD-10-CM

## 2022-07-11 DIAGNOSIS — G89.28: ICD-10-CM

## 2022-07-11 DIAGNOSIS — I24.8: ICD-10-CM

## 2022-07-11 DIAGNOSIS — R11.0: ICD-10-CM

## 2022-07-11 DIAGNOSIS — I50.23: ICD-10-CM

## 2022-07-11 DIAGNOSIS — Z88.8: ICD-10-CM

## 2022-07-11 DIAGNOSIS — I35.0: Primary | ICD-10-CM

## 2022-07-11 DIAGNOSIS — E78.5: ICD-10-CM

## 2022-07-11 DIAGNOSIS — Z90.49: ICD-10-CM

## 2022-07-11 DIAGNOSIS — Z88.5: ICD-10-CM

## 2022-07-11 DIAGNOSIS — Z95.1: ICD-10-CM

## 2022-07-11 DIAGNOSIS — I11.0: Primary | ICD-10-CM

## 2022-07-11 DIAGNOSIS — E43: ICD-10-CM

## 2022-07-11 LAB
ALBUMIN SERPL BCG-MCNC: 3.9 G/DL (ref 3.4–4.8)
ALP SERPL-CCNC: 58 U/L (ref 40–110)
ALT SERPL W P-5'-P-CCNC: 11 U/L (ref 8–55)
ANION GAP SERPL CALC-SCNC: 16 MMOL/L (ref 10–20)
AST SERPL-CCNC: 15 U/L (ref 5–34)
BASOPHILS # BLD AUTO: 0.1 THOU/UL (ref 0–0.2)
BASOPHILS NFR BLD AUTO: 1.1 % (ref 0–1)
BILIRUB SERPL-MCNC: 0.9 MG/DL (ref 0.2–1.2)
BUN SERPL-MCNC: 21 MG/DL (ref 9.8–20.1)
CALCIUM SERPL-MCNC: 9.4 MG/DL (ref 7.8–10.44)
CHLORIDE SERPL-SCNC: 99 MMOL/L (ref 98–107)
CK MB SERPL-MCNC: 2.7 NG/ML (ref 0–6.6)
CO2 SERPL-SCNC: 29 MMOL/L (ref 23–31)
CREAT CL PREDICTED SERPL C-G-VRATE: 0 ML/MIN (ref 70–130)
EOSINOPHIL # BLD AUTO: 0.1 THOU/UL (ref 0–0.7)
EOSINOPHIL NFR BLD AUTO: 1.2 % (ref 0–10)
GLOBULIN SER CALC-MCNC: 2.8 G/DL (ref 2.4–3.5)
GLUCOSE SERPL-MCNC: 158 MG/DL (ref 83–110)
HGB BLD-MCNC: 13.8 G/DL (ref 12–16)
LIPASE SERPL-CCNC: 25 U/L (ref 8–78)
LYMPHOCYTES # BLD AUTO: 0.9 THOU/UL (ref 1.2–3.4)
LYMPHOCYTES NFR BLD AUTO: 9.6 % (ref 21–51)
MAGNESIUM SERPL-MCNC: 1.7 MG/DL (ref 1.6–2.6)
MCH RBC QN AUTO: 32.1 PG (ref 27–31)
MCV RBC AUTO: 103 FL (ref 78–98)
MONOCYTES # BLD AUTO: 0.5 THOU/UL (ref 0.11–0.59)
MONOCYTES NFR BLD AUTO: 5.8 % (ref 0–10)
NEUTROPHILS # BLD AUTO: 7.5 THOU/UL (ref 1.4–6.5)
NEUTROPHILS NFR BLD AUTO: 82.3 % (ref 42–75)
PLATELET # BLD AUTO: 280 THOU/UL (ref 130–400)
POTASSIUM SERPL-SCNC: 3.8 MMOL/L (ref 3.5–5.1)
RBC # BLD AUTO: 4.31 MILL/UL (ref 4.2–5.4)
SODIUM SERPL-SCNC: 140 MMOL/L (ref 136–145)
SP GR UR STRIP: 1.02 (ref 1–1.03)
TROPONIN I SERPL DL<=0.01 NG/ML-MCNC: 0.1 NG/ML (ref ?–0.03)
TROPONIN I SERPL DL<=0.01 NG/ML-MCNC: 0.11 NG/ML (ref ?–0.03)
WBC # BLD AUTO: 9.1 THOU/UL (ref 4.8–10.8)

## 2022-07-11 PROCEDURE — 93005 ELECTROCARDIOGRAM TRACING: CPT

## 2022-07-11 PROCEDURE — 80048 BASIC METABOLIC PNL TOTAL CA: CPT

## 2022-07-11 PROCEDURE — 97139 UNLISTED THERAPEUTIC PX: CPT

## 2022-07-11 PROCEDURE — 83735 ASSAY OF MAGNESIUM: CPT

## 2022-07-11 PROCEDURE — 96374 THER/PROPH/DIAG INJ IV PUSH: CPT

## 2022-07-11 PROCEDURE — 85025 COMPLETE CBC W/AUTO DIFF WBC: CPT

## 2022-07-11 PROCEDURE — 80053 COMPREHEN METABOLIC PANEL: CPT

## 2022-07-11 PROCEDURE — 93010 ELECTROCARDIOGRAM REPORT: CPT

## 2022-07-11 PROCEDURE — U0005 INFEC AGEN DETEC AMPLI PROBE: HCPCS

## 2022-07-11 PROCEDURE — 96372 THER/PROPH/DIAG INJ SC/IM: CPT

## 2022-07-11 PROCEDURE — 82553 CREATINE MB FRACTION: CPT

## 2022-07-11 PROCEDURE — 84484 ASSAY OF TROPONIN QUANT: CPT

## 2022-07-11 PROCEDURE — 85379 FIBRIN DEGRADATION QUANT: CPT

## 2022-07-11 PROCEDURE — G0378 HOSPITAL OBSERVATION PER HR: HCPCS

## 2022-07-11 PROCEDURE — 83880 ASSAY OF NATRIURETIC PEPTIDE: CPT

## 2022-07-11 PROCEDURE — 96376 TX/PRO/DX INJ SAME DRUG ADON: CPT

## 2022-07-11 PROCEDURE — 71250 CT THORAX DX C-: CPT

## 2022-07-11 PROCEDURE — 81003 URINALYSIS AUTO W/O SCOPE: CPT

## 2022-07-11 PROCEDURE — 94640 AIRWAY INHALATION TREATMENT: CPT

## 2022-07-11 PROCEDURE — 83690 ASSAY OF LIPASE: CPT

## 2022-07-11 PROCEDURE — U0003 INFECTIOUS AGENT DETECTION BY NUCLEIC ACID (DNA OR RNA); SEVERE ACUTE RESPIRATORY SYNDROME CORONAVIRUS 2 (SARS-COV-2) (CORONAVIRUS DISEASE [COVID-19]), AMPLIFIED PROBE TECHNIQUE, MAKING USE OF HIGH THROUGHPUT TECHNOLOGIES AS DESCRIBED BY CMS-2020-01-R: HCPCS

## 2022-07-11 PROCEDURE — 96375 TX/PRO/DX INJ NEW DRUG ADDON: CPT

## 2022-07-11 PROCEDURE — 36415 COLL VENOUS BLD VENIPUNCTURE: CPT

## 2022-07-11 PROCEDURE — 71045 X-RAY EXAM CHEST 1 VIEW: CPT

## 2022-07-11 PROCEDURE — 94760 N-INVAS EAR/PLS OXIMETRY 1: CPT

## 2022-07-11 RX ADMIN — HYDROCODONE BITARTRATE AND ACETAMINOPHEN PRN TAB: 5; 325 TABLET ORAL at 23:50

## 2022-07-11 RX ADMIN — NITROGLYCERIN SCH INCH: 20 OINTMENT TOPICAL at 21:20

## 2022-07-12 LAB
ALBUMIN SERPL BCG-MCNC: 3.6 G/DL (ref 3.4–4.8)
ALP SERPL-CCNC: 51 U/L (ref 40–110)
ALT SERPL W P-5'-P-CCNC: 11 U/L (ref 8–55)
ANION GAP SERPL CALC-SCNC: 15 MMOL/L (ref 10–20)
AST SERPL-CCNC: 15 U/L (ref 5–34)
BASOPHILS # BLD AUTO: 0.1 THOU/UL (ref 0–0.2)
BASOPHILS NFR BLD AUTO: 1 % (ref 0–1)
BILIRUB SERPL-MCNC: 0.7 MG/DL (ref 0.2–1.2)
BUN SERPL-MCNC: 26 MG/DL (ref 9.8–20.1)
CALCIUM SERPL-MCNC: 9.6 MG/DL (ref 7.8–10.44)
CHLORIDE SERPL-SCNC: 99 MMOL/L (ref 98–107)
CO2 SERPL-SCNC: 29 MMOL/L (ref 23–31)
CREAT CL PREDICTED SERPL C-G-VRATE: 16 ML/MIN (ref 70–130)
EOSINOPHIL # BLD AUTO: 0.2 THOU/UL (ref 0–0.7)
EOSINOPHIL NFR BLD AUTO: 2.5 % (ref 0–10)
GLOBULIN SER CALC-MCNC: 2.6 G/DL (ref 2.4–3.5)
GLUCOSE SERPL-MCNC: 101 MG/DL (ref 83–110)
HGB BLD-MCNC: 12.1 G/DL (ref 12–16)
LYMPHOCYTES # BLD: 1.4 THOU/UL (ref 1.2–3.4)
LYMPHOCYTES NFR BLD AUTO: 17.6 % (ref 21–51)
MCH RBC QN AUTO: 33 PG (ref 27–31)
MCV RBC AUTO: 103 FL (ref 78–98)
MONOCYTES # BLD AUTO: 1 THOU/UL (ref 0.11–0.59)
MONOCYTES NFR BLD AUTO: 12 % (ref 0–10)
NEUTROPHILS # BLD AUTO: 5.4 THOU/UL (ref 1.4–6.5)
NEUTROPHILS NFR BLD AUTO: 66.9 % (ref 42–75)
PLATELET # BLD AUTO: 224 THOU/UL (ref 130–400)
POTASSIUM SERPL-SCNC: 4.2 MMOL/L (ref 3.5–5.1)
RBC # BLD AUTO: 3.68 MILL/UL (ref 4.2–5.4)
SODIUM SERPL-SCNC: 139 MMOL/L (ref 136–145)
WBC # BLD AUTO: 8.1 THOU/UL (ref 4.8–10.8)

## 2022-07-12 RX ADMIN — HYDROCODONE BITARTRATE AND ACETAMINOPHEN PRN TAB: 5; 325 TABLET ORAL at 12:58

## 2022-07-12 RX ADMIN — NITROGLYCERIN SCH INCH: 20 OINTMENT TOPICAL at 06:17

## 2022-07-12 RX ADMIN — MOMETASONE FUROATE AND FORMOTEROL FUMARATE DIHYDRATE SCH PUFF: 200; 5 AEROSOL RESPIRATORY (INHALATION) at 19:05

## 2022-07-12 RX ADMIN — NITROGLYCERIN SCH INCH: 20 OINTMENT TOPICAL at 13:00

## 2022-07-12 RX ADMIN — NITROGLYCERIN SCH: 20 OINTMENT TOPICAL at 21:16

## 2022-07-12 RX ADMIN — ASPIRIN 81 MG CHEWABLE TABLET SCH MG: 81 TABLET CHEWABLE at 08:58

## 2022-07-13 VITALS — SYSTOLIC BLOOD PRESSURE: 159 MMHG | DIASTOLIC BLOOD PRESSURE: 62 MMHG

## 2022-07-13 LAB
ANION GAP SERPL CALC-SCNC: 17 MMOL/L (ref 10–20)
BUN SERPL-MCNC: 26 MG/DL (ref 9.8–20.1)
CALCIUM SERPL-MCNC: 9.3 MG/DL (ref 7.8–10.44)
CHLORIDE SERPL-SCNC: 98 MMOL/L (ref 98–107)
CO2 SERPL-SCNC: 28 MMOL/L (ref 23–31)
CREAT CL PREDICTED SERPL C-G-VRATE: 16 ML/MIN (ref 70–130)
GLUCOSE SERPL-MCNC: 122 MG/DL (ref 83–110)
POTASSIUM SERPL-SCNC: 4.1 MMOL/L (ref 3.5–5.1)
SODIUM SERPL-SCNC: 139 MMOL/L (ref 136–145)

## 2022-07-13 RX ADMIN — Medication SCH: at 12:25

## 2022-07-13 RX ADMIN — ASPIRIN 81 MG CHEWABLE TABLET SCH MG: 81 TABLET CHEWABLE at 12:23

## 2022-07-13 RX ADMIN — MOMETASONE FUROATE AND FORMOTEROL FUMARATE DIHYDRATE SCH PUFF: 200; 5 AEROSOL RESPIRATORY (INHALATION) at 19:09

## 2022-07-13 RX ADMIN — NITROGLYCERIN SCH INCH: 20 OINTMENT TOPICAL at 15:59

## 2022-07-13 RX ADMIN — HYDROCODONE BITARTRATE AND ACETAMINOPHEN PRN TAB: 5; 325 TABLET ORAL at 15:57

## 2022-07-13 RX ADMIN — NITROGLYCERIN SCH INCH: 20 OINTMENT TOPICAL at 06:43

## 2022-07-13 RX ADMIN — NITROGLYCERIN SCH INCH: 20 OINTMENT TOPICAL at 21:42

## 2022-07-13 RX ADMIN — MOMETASONE FUROATE AND FORMOTEROL FUMARATE DIHYDRATE SCH PUFF: 200; 5 AEROSOL RESPIRATORY (INHALATION) at 07:27

## 2022-07-14 VITALS — TEMPERATURE: 98.8 F

## 2022-07-14 LAB
ANION GAP SERPL CALC-SCNC: 24 MMOL/L (ref 10–20)
BASOPHILS # BLD AUTO: 0 THOU/UL (ref 0–0.2)
BASOPHILS NFR BLD AUTO: 0 % (ref 0–1)
BUN SERPL-MCNC: 30 MG/DL (ref 9.8–20.1)
CALCIUM SERPL-MCNC: 10 MG/DL (ref 7.8–10.44)
CHLORIDE SERPL-SCNC: 95 MMOL/L (ref 98–107)
CO2 SERPL-SCNC: 23 MMOL/L (ref 23–31)
CREAT CL PREDICTED SERPL C-G-VRATE: 15 ML/MIN (ref 70–130)
EOSINOPHIL # BLD AUTO: 0 THOU/UL (ref 0–0.7)
EOSINOPHIL NFR BLD AUTO: 0.1 % (ref 0–10)
GLUCOSE SERPL-MCNC: 123 MG/DL (ref 83–110)
HGB BLD-MCNC: 12.9 G/DL (ref 12–16)
LYMPHOCYTES # BLD: 0.7 THOU/UL (ref 1.2–3.4)
LYMPHOCYTES NFR BLD AUTO: 9.3 % (ref 21–51)
MCH RBC QN AUTO: 33.3 PG (ref 27–31)
MCV RBC AUTO: 103 FL (ref 78–98)
MONOCYTES # BLD AUTO: 0.5 THOU/UL (ref 0.11–0.59)
MONOCYTES NFR BLD AUTO: 7 % (ref 0–10)
NEUTROPHILS # BLD AUTO: 6.5 THOU/UL (ref 1.4–6.5)
NEUTROPHILS NFR BLD AUTO: 83.6 % (ref 42–75)
PLATELET # BLD AUTO: 229 THOU/UL (ref 130–400)
POTASSIUM SERPL-SCNC: 4.4 MMOL/L (ref 3.5–5.1)
RBC # BLD AUTO: 3.89 MILL/UL (ref 4.2–5.4)
SODIUM SERPL-SCNC: 138 MMOL/L (ref 136–145)
WBC # BLD AUTO: 7.7 THOU/UL (ref 4.8–10.8)

## 2022-07-14 RX ADMIN — MOMETASONE FUROATE AND FORMOTEROL FUMARATE DIHYDRATE SCH PUFF: 200; 5 AEROSOL RESPIRATORY (INHALATION) at 06:08

## 2022-07-14 RX ADMIN — NITROGLYCERIN SCH: 20 OINTMENT TOPICAL at 17:44

## 2022-07-14 RX ADMIN — ASPIRIN 81 MG CHEWABLE TABLET SCH MG: 81 TABLET CHEWABLE at 10:15

## 2022-07-14 RX ADMIN — Medication SCH: at 10:12

## 2022-07-14 RX ADMIN — NITROGLYCERIN SCH: 20 OINTMENT TOPICAL at 23:36

## 2022-07-14 RX ADMIN — NITROGLYCERIN SCH INCH: 20 OINTMENT TOPICAL at 06:26

## 2022-07-14 RX ADMIN — MOMETASONE FUROATE AND FORMOTEROL FUMARATE DIHYDRATE SCH PUFF: 200; 5 AEROSOL RESPIRATORY (INHALATION) at 18:23
